# Patient Record
Sex: FEMALE
[De-identification: names, ages, dates, MRNs, and addresses within clinical notes are randomized per-mention and may not be internally consistent; named-entity substitution may affect disease eponyms.]

---

## 2023-12-29 ENCOUNTER — P2P PATIENT RECORD (OUTPATIENT)
Age: 28
End: 2023-12-29

## 2024-01-09 ENCOUNTER — TELEPHONE ENCOUNTER (OUTPATIENT)
Dept: URBAN - METROPOLITAN AREA CLINIC 27 | Facility: CLINIC | Age: 29
End: 2024-01-09

## 2024-01-09 ENCOUNTER — OFFICE VISIT (OUTPATIENT)
Dept: URBAN - METROPOLITAN AREA CLINIC 27 | Facility: CLINIC | Age: 29
End: 2024-01-09
Payer: COMMERCIAL

## 2024-01-09 VITALS
DIASTOLIC BLOOD PRESSURE: 80 MMHG | RESPIRATION RATE: 17 BRPM | WEIGHT: 120 LBS | HEIGHT: 60 IN | HEART RATE: 79 BPM | BODY MASS INDEX: 23.56 KG/M2 | SYSTOLIC BLOOD PRESSURE: 129 MMHG

## 2024-01-09 DIAGNOSIS — R19.8 IRREGULAR BOWEL HABITS: ICD-10-CM

## 2024-01-09 DIAGNOSIS — R10.13 EPIGASTRIC PAIN: ICD-10-CM

## 2024-01-09 DIAGNOSIS — R11.0 NAUSEA: ICD-10-CM

## 2024-01-09 PROBLEM — 79922009: Status: ACTIVE | Noted: 2024-01-09

## 2024-01-09 PROBLEM — 422587007: Status: ACTIVE | Noted: 2024-01-09

## 2024-01-09 PROBLEM — 444702007: Status: ACTIVE | Noted: 2024-01-09

## 2024-01-09 PROCEDURE — 99204 OFFICE O/P NEW MOD 45 MIN: CPT | Performed by: INTERNAL MEDICINE

## 2024-01-09 RX ORDER — HYOSCYAMINE SULFATE 0.125 MG
1 TABLET ON THE TONGUE AND ALLOW TO DISSOLVE AS NEEDED TABLET,DISINTEGRATING ORAL
Qty: 30 | Refills: 1 | OUTPATIENT
Start: 2024-01-09 | End: 2024-03-09

## 2024-01-09 NOTE — HPI-TODAY'S VISIT:
is a 28 YOF new patient presenting for evaluation of abdominal pain with Dr. Aquino. She had the flu several wks ago, had persistent cough. During/at the end of this she developed acute onset epigastric and RUQ pain. Pain became severe over the course of hours and she went to [facility] ER where labs were grossly normal (mild hypokalemia 3.3, normal CBC, LFTs). Abdominal US was normal but she did have a positive sonographic Barraza's sign. She was DCd with Zofran, ibuprofen. Pain has improved significantly, now mild and intermittent. It is now more periumbilical. It is sometimes worse after eating or when she needs to have a BM. Nausea is controlled w/Zofran; no vomiting. BMs have been alternating between constipation and diarrhea. No melena or hematochezia. She is taking ibuprofen now but was not using NSAIDs regularly priot to this. No prior h/o similar sx. No prior surgeries. No tobacco, drug or significant EtOH use. H. pylori breath test yesterday was apparently negative. She has chronic tachycardia, is being evaluated for thyroid issues.

## 2024-01-09 NOTE — PHYSICAL EXAM CHEST:
no lesions,  no deformities,  no traumatic injuries,  no significant scars are present,  no masses present, breathing is unlabored without accessory muscle use,normal breath sounds; mildly TTP along R costal margin

## 2024-01-09 NOTE — PHYSICAL EXAM GASTROINTESTINAL
Abdomen , soft, mildly TTP diffusely, nondistended , no guarding or rigidity , no masses palpable , normal bowel sounds , Liver and Spleen , no hepatomegaly present , no hepatosplenomegaly , liver nontender , spleen not palpable

## 2024-02-22 ENCOUNTER — EGD (OUTPATIENT)
Dept: URBAN - METROPOLITAN AREA SURGERY CENTER 7 | Facility: SURGERY CENTER | Age: 29
End: 2024-02-22

## 2024-02-22 ENCOUNTER — LAB (OUTPATIENT)
Dept: URBAN - METROPOLITAN AREA CLINIC 4 | Facility: CLINIC | Age: 29
End: 2024-02-22
Payer: COMMERCIAL

## 2024-02-22 DIAGNOSIS — K29.70 GASTRITIS, UNSPECIFIED, WITHOUT BLEEDING: ICD-10-CM

## 2024-02-22 PROCEDURE — 88305 TISSUE EXAM BY PATHOLOGIST: CPT | Performed by: PATHOLOGY

## 2024-02-22 PROCEDURE — 88312 SPECIAL STAINS GROUP 1: CPT | Performed by: PATHOLOGY

## 2024-02-22 RX ORDER — HYOSCYAMINE SULFATE 0.125 MG
1 TABLET ON THE TONGUE AND ALLOW TO DISSOLVE AS NEEDED TABLET,DISINTEGRATING ORAL
Qty: 30 | Refills: 1 | Status: ACTIVE | COMMUNITY
Start: 2024-01-09 | End: 2024-03-09